# Patient Record
Sex: FEMALE | Race: WHITE | NOT HISPANIC OR LATINO
[De-identification: names, ages, dates, MRNs, and addresses within clinical notes are randomized per-mention and may not be internally consistent; named-entity substitution may affect disease eponyms.]

---

## 2017-08-25 ENCOUNTER — TRANSCRIPTION ENCOUNTER (OUTPATIENT)
Age: 52
End: 2017-08-25

## 2018-07-16 ENCOUNTER — APPOINTMENT (OUTPATIENT)
Dept: ORTHOPEDIC SURGERY | Facility: CLINIC | Age: 53
End: 2018-07-16
Payer: COMMERCIAL

## 2018-07-16 VITALS
SYSTOLIC BLOOD PRESSURE: 127 MMHG | HEART RATE: 89 BPM | DIASTOLIC BLOOD PRESSURE: 90 MMHG | HEIGHT: 70 IN | WEIGHT: 250 LBS | BODY MASS INDEX: 35.79 KG/M2

## 2018-07-16 DIAGNOSIS — Z85.3 PERSONAL HISTORY OF MALIGNANT NEOPLASM OF BREAST: ICD-10-CM

## 2018-07-16 DIAGNOSIS — M79.605 LOW BACK PAIN: ICD-10-CM

## 2018-07-16 DIAGNOSIS — M51.26 OTHER INTERVERTEBRAL DISC DISPLACEMENT, LUMBAR REGION: ICD-10-CM

## 2018-07-16 DIAGNOSIS — M54.16 RADICULOPATHY, LUMBAR REGION: ICD-10-CM

## 2018-07-16 DIAGNOSIS — M54.5 LOW BACK PAIN: ICD-10-CM

## 2018-07-16 DIAGNOSIS — Z87.09 PERSONAL HISTORY OF OTHER DISEASES OF THE RESPIRATORY SYSTEM: ICD-10-CM

## 2018-07-16 DIAGNOSIS — Z80.9 FAMILY HISTORY OF MALIGNANT NEOPLASM, UNSPECIFIED: ICD-10-CM

## 2018-07-16 DIAGNOSIS — Z78.9 OTHER SPECIFIED HEALTH STATUS: ICD-10-CM

## 2018-07-16 DIAGNOSIS — Z87.19 PERSONAL HISTORY OF OTHER DISEASES OF THE DIGESTIVE SYSTEM: ICD-10-CM

## 2018-07-16 PROCEDURE — 99204 OFFICE O/P NEW MOD 45 MIN: CPT

## 2018-07-16 PROCEDURE — 72110 X-RAY EXAM L-2 SPINE 4/>VWS: CPT

## 2018-07-16 PROCEDURE — 72170 X-RAY EXAM OF PELVIS: CPT | Mod: 59

## 2018-07-16 RX ORDER — PAROXETINE HYDROCHLORIDE 40 MG/1
TABLET, FILM COATED ORAL
Refills: 0 | Status: ACTIVE | COMMUNITY

## 2018-07-16 RX ORDER — METHYLPREDNISOLONE 4 MG/1
4 TABLET ORAL
Qty: 1 | Refills: 0 | Status: ACTIVE | COMMUNITY
Start: 2018-07-16 | End: 1900-01-01

## 2018-07-18 ENCOUNTER — CHART COPY (OUTPATIENT)
Age: 53
End: 2018-07-18

## 2018-08-06 ENCOUNTER — OUTPATIENT (OUTPATIENT)
Dept: OUTPATIENT SERVICES | Facility: HOSPITAL | Age: 53
LOS: 1 days | End: 2018-08-06
Payer: COMMERCIAL

## 2018-08-06 DIAGNOSIS — M54.16 RADICULOPATHY, LUMBAR REGION: ICD-10-CM

## 2018-08-06 PROCEDURE — 77003 FLUOROGUIDE FOR SPINE INJECT: CPT

## 2018-08-06 PROCEDURE — 62323 NJX INTERLAMINAR LMBR/SAC: CPT

## 2020-12-09 DIAGNOSIS — Z01.818 ENCOUNTER FOR OTHER PREPROCEDURAL EXAMINATION: ICD-10-CM

## 2020-12-11 ENCOUNTER — APPOINTMENT (OUTPATIENT)
Dept: DISASTER EMERGENCY | Facility: CLINIC | Age: 55
End: 2020-12-11

## 2020-12-13 LAB — SARS-COV-2 N GENE NPH QL NAA+PROBE: NOT DETECTED

## 2022-10-06 ENCOUNTER — APPOINTMENT (OUTPATIENT)
Dept: ORTHOPEDIC SURGERY | Facility: CLINIC | Age: 57
End: 2022-10-06

## 2022-10-06 PROCEDURE — 73564 X-RAY EXAM KNEE 4 OR MORE: CPT | Mod: RT

## 2022-10-06 PROCEDURE — 99203 OFFICE O/P NEW LOW 30 MIN: CPT

## 2022-10-06 NOTE — PHYSICAL EXAM
[NL (0)] : extension 0 degrees [5___] : hamstring 5[unfilled]/5 [Positive] : positive Camila [] : patient ambulates without assistive device [Right] : right knee [AP] : anteroposterior [Lateral] : lateral [[place]] : skyline [AP Standing] : anteroposterior standing [There are no fractures, subluxations or dislocations. No significant abnormalities are seen] : There are no fractures, subluxations or dislocations. No significant abnormalities are seen [Degenerative change] : Degenerative change [TWNoteComboBox7] : flexion 130 degrees

## 2022-10-06 NOTE — HISTORY OF PRESENT ILLNESS
[7] : 7 [3] : 3 [Dull/Aching] : dull/aching [Sharp] : sharp [Shooting] : shooting [de-identified] : 10/6/22: 58 yo f () here for an evaluation of her right knee. Pain ongoing x 6 weeks without injury. Pain worse with twisting injuries and with ambulation.  Pain and difficulty with fully bending the knee, sense of the knee locking. Occasional buckling. She has been doing HEP, icing and taking advil without relief. [] : no [FreeTextEntry1] : rt knee [FreeTextEntry5] : no reported injury, pain started about 6 weeks ago.history of knee issues in the right knee. [FreeTextEntry7] : into the thigh

## 2022-10-06 NOTE — DISCUSSION/SUMMARY
[de-identified] : 57f with medial right knee pain, djd and instability. s/s of meniscal tear\par 1) MRI right knee, eval meniscal tear\par 2) cryotherapy, rest and activity modification\par 3) rtc after MRI \par \par Entered by Joie Mccall acting as scribe.\par

## 2022-10-09 ENCOUNTER — FORM ENCOUNTER (OUTPATIENT)
Age: 57
End: 2022-10-09

## 2022-10-10 ENCOUNTER — APPOINTMENT (OUTPATIENT)
Dept: MRI IMAGING | Facility: CLINIC | Age: 57
End: 2022-10-10

## 2022-10-10 PROCEDURE — 73721 MRI JNT OF LWR EXTRE W/O DYE: CPT | Mod: RT

## 2022-10-12 ENCOUNTER — APPOINTMENT (OUTPATIENT)
Dept: ORTHOPEDIC SURGERY | Facility: CLINIC | Age: 57
End: 2022-10-12

## 2022-10-12 ENCOUNTER — APPOINTMENT (OUTPATIENT)
Dept: MRI IMAGING | Facility: CLINIC | Age: 57
End: 2022-10-12

## 2022-10-12 VITALS — HEIGHT: 70 IN | BODY MASS INDEX: 35.79 KG/M2 | WEIGHT: 250 LBS

## 2022-10-12 PROCEDURE — 99214 OFFICE O/P EST MOD 30 MIN: CPT

## 2022-10-12 NOTE — DATA REVIEWED
[MRI] : MRI [Right] : of the right [Knee] : knee [Report was reviewed and noted in the chart] : The report was reviewed and noted in the chart [I independently reviewed and interpreted images and report] : I independently reviewed and interpreted images and report [I reviewed the films/CD] : I reviewed the films/CD [FreeTextEntry1] : 10.10.22\par 1. Complex posterior medial meniscal root tearing with severe surrounding synovitis and mild medial extrusion of the body.\par 2. Chronic ligament sprains, MCL laxity, extensor mechanism tendinopathy, and tricompartmental chondral \par loss and osteophytes with medial joint space narrowing, mild subchondral edema in the posterior medial tibial plateau and moderate effusion with mild prepatellar soft tissue swelling/bursitis.

## 2022-10-12 NOTE — DISCUSSION/SUMMARY
[de-identified] : 57f  with right knee complex posterior medial meniscal root tear.\par r/b/a of surgical intervention and conservative management discussed. pt given to opportunity to ask all questions and all questions were answered.   Pt would like to proceed with surgery as discussed.\par \par Will plan for right knee arthroscopy, partial medial meniscectomy vs meniscal root repair. \par \par The patient was advised of the diagnosis. The natural history of the pathology was explained in full to the patient in layman's terms. All questions were answered. The risks and benefits of surgical and non-surgical treatment alternatives were explained in full to the patient. \par The patient demonstrated a full understanding of the surgical and non-surgical options. The risks of surgery were outlined in full to the patient including but not limited to bleeding, scarring, infection, sepsis, neurologic injury, vascular injury, failure to resolve symptoms, symptom recurrence, the need for further surgery, non-healing, wound breakdown, deep vein thrombosis, pulmonary embolism, spontaneous osteonecrosis, anesthesia complications and even death. The patient understood all the risks and accepted them and understood that other complications could occur that are not mentioned above. The intraoperative plan, post-operative plan, post-operative expectations and limitations were explained in full. Expectations from non-surgical treatment were explained in full as well. The patient demonstrated a complete understanding of the treatment alternatives and requested the above-mentioned procedure. This will be scheduled accordingly\par \par The patient has two pathologic conditions at this point. There is a meniscus tear which is causing symptomology and there is also underlying osteoarthritis. The patient was counseled that surgical intervention to address the torn meniscus will not change the symptomology attributable to the arthritis. The patient was advised that the pain attributable to the meniscus tear should be resolved arthroscopically. However, the patient should expect to have continued aches and pains related to the arthritis, in the form of, but not limited to pain, weather related changes, dull ache, crepitation, limitation of motion and strength and the need for further surgeries. The patient understands that the arthroscopic procedure addresses the meniscus only and that after surgery, the knee will not be 100% but it should be improved with respect to the symptomology attributed to the torn meniscus. Patient also is aware that further treatment after arthroscopy may be necessary in the form of oral medications, cortisone and/or viscosupplementation injections, and further surgeries including knee replacement. The patient agrees, understands and wishes to proceed.\par \par \par Entered by Joie Mccall acting as scribe.\par  \par

## 2022-10-12 NOTE — PHYSICAL EXAM
[NL (0)] : extension 0 degrees [5___] : hamstring 5[unfilled]/5 [Positive] : positive Camila [Right] : right knee [AP] : anteroposterior [Lateral] : lateral [[place]] : skyline [AP Standing] : anteroposterior standing [There are no fractures, subluxations or dislocations. No significant abnormalities are seen] : There are no fractures, subluxations or dislocations. No significant abnormalities are seen [Degenerative change] : Degenerative change [] : no extensor lag [TWNoteComboBox7] : flexion 130 degrees

## 2022-10-12 NOTE — HISTORY OF PRESENT ILLNESS
[7] : 7 [3] : 3 [Dull/Aching] : dull/aching [Sharp] : sharp [Shooting] : shooting [de-identified] : 10/12/22: Here for right knee MRI results\par 10/6/22: 56 yo f () here for an evaluation of her right knee. Pain ongoing x 6 weeks without injury. Pain worse with twisting injuries and with ambulation.  Pain and difficulty with fully bending the knee, sense of the knee locking. Occasional buckling. She has been doing HEP, icing and taking advil without relief. [] : no [FreeTextEntry1] : rt knee [FreeTextEntry5] : no reported injury, pain started about 6 weeks ago.history of knee issues in the right knee. [FreeTextEntry7] : into the thigh

## 2022-10-19 ENCOUNTER — APPOINTMENT (OUTPATIENT)
Dept: ORTHOPEDIC SURGERY | Facility: CLINIC | Age: 57
End: 2022-10-19

## 2022-10-19 ENCOUNTER — FORM ENCOUNTER (OUTPATIENT)
Age: 57
End: 2022-10-19

## 2022-10-31 ENCOUNTER — APPOINTMENT (OUTPATIENT)
Age: 57
End: 2022-10-31
Payer: COMMERCIAL

## 2022-10-31 DIAGNOSIS — M23.91 UNSPECIFIED INTERNAL DERANGEMENT OF RIGHT KNEE: ICD-10-CM

## 2022-10-31 PROCEDURE — 29881 ARTHRS KNE SRG MNISECTMY M/L: CPT | Mod: RT

## 2022-10-31 PROCEDURE — 29876 ARTHRS KNEE SURG SYNVCT MAJ: CPT | Mod: RT

## 2022-10-31 PROCEDURE — ZZZZZ: CPT

## 2022-10-31 RX ORDER — HYDROCODONE BITARTRATE AND ACETAMINOPHEN 5; 325 MG/1; MG/1
5-325 TABLET ORAL EVERY 6 HOURS
Qty: 15 | Refills: 0 | Status: ACTIVE | COMMUNITY
Start: 2022-10-31 | End: 1900-01-01

## 2022-10-31 RX ORDER — ASPIRIN 325 MG/1
325 TABLET, FILM COATED ORAL DAILY
Qty: 14 | Refills: 0 | Status: ACTIVE | COMMUNITY
Start: 2022-10-31 | End: 1900-01-01

## 2022-11-09 ENCOUNTER — APPOINTMENT (OUTPATIENT)
Dept: ORTHOPEDIC SURGERY | Facility: CLINIC | Age: 57
End: 2022-11-09
Payer: COMMERCIAL

## 2022-11-09 ENCOUNTER — APPOINTMENT (OUTPATIENT)
Dept: ORTHOPEDIC SURGERY | Facility: CLINIC | Age: 57
End: 2022-11-09

## 2022-11-09 PROCEDURE — 99024 POSTOP FOLLOW-UP VISIT: CPT

## 2022-11-09 NOTE — DISCUSSION/SUMMARY
[de-identified] : 57f 9 days s/p right knee partial meniscectomy. doing well. working with pt. mild effusion\par 1) continue pt\par 2) wbat\par 3) activity modification discussed\par 4) cryotherapy\par 5) rtc in 4 weeks

## 2022-11-09 NOTE — HISTORY OF PRESENT ILLNESS
[Localized] : localized [Household chores] : household chores [Physical therapy] : physical therapy [Walking] : walking [Gradual] : gradual [8] : 8 [2] : 2 [Sharp] : sharp [Intermittent] : intermittent [de-identified] : DOS: 10/31/2022\par \par 11/09/22 here for first post op visit \par  right knee arthroscopy, partial medial meniscectomy done on 10/31/22 doing well\par \par  [] : no [FreeTextEntry1] : right knee [de-identified] : sx

## 2022-11-09 NOTE — PHYSICAL EXAM
[Right] : right knee [4___] : hamstring 4[unfilled]/5 [] : mildly antalgic [TWNoteComboBox7] : flexion 110 degrees [de-identified] : extension 3 degrees

## 2022-11-16 ENCOUNTER — APPOINTMENT (OUTPATIENT)
Dept: ORTHOPEDIC SURGERY | Facility: CLINIC | Age: 57
End: 2022-11-16

## 2022-12-07 ENCOUNTER — APPOINTMENT (OUTPATIENT)
Dept: ORTHOPEDIC SURGERY | Facility: CLINIC | Age: 57
End: 2022-12-07
Payer: COMMERCIAL

## 2022-12-07 VITALS — BODY MASS INDEX: 35.79 KG/M2 | WEIGHT: 250 LBS | HEIGHT: 70 IN

## 2022-12-07 PROCEDURE — 99024 POSTOP FOLLOW-UP VISIT: CPT

## 2022-12-07 NOTE — HISTORY OF PRESENT ILLNESS
Pt advised    [Gradual] : gradual [8] : 8 [0] : 0 [Localized] : localized [Sharp] : sharp [Intermittent] : intermittent [Household chores] : household chores [Physical therapy] : physical therapy [Walking] : walking [Full time] : Work status: full time [de-identified] : DOS: 10/31/2022\par 12/7/22: Here for pov2 s/p right knee pmm. Doing well. Attending PT. \par 11/09/22 here for first post op visit  right knee arthroscopy, partial medial meniscectomy done on 10/31/22 doing well\par \par  [] : no [FreeTextEntry1] : right knee [FreeTextEntry5] : 2nd post op visit, right knee arthroscopy, partial medial meniscectomy done on 10/31/22, doing well. continuing PT [de-identified] : sx [de-identified] :

## 2022-12-07 NOTE — DISCUSSION/SUMMARY
[de-identified] : 57f s/p right knee pmm 10.31.22\par 1) c/w pt and hep\par 2) cryotherapy, rest and activity modification\par 3) activity as tolerated\par 4) rtc prn\par \par Entered by Joie Mccall acting as scribe.\par

## 2022-12-07 NOTE — PHYSICAL EXAM
[Right] : right knee [] : patient ambulates without assistive device [TWNoteComboBox7] : flexion 135 degrees [de-identified] : extension 3 degrees

## 2023-01-10 DIAGNOSIS — S83.231A COMPLEX TEAR OF MEDIAL MENISCUS, CURRENT INJURY, RIGHT KNEE, INITIAL ENCOUNTER: ICD-10-CM

## 2023-01-18 ENCOUNTER — APPOINTMENT (OUTPATIENT)
Dept: ORTHOPEDIC SURGERY | Facility: CLINIC | Age: 58
End: 2023-01-18
Payer: COMMERCIAL

## 2023-01-18 VITALS — HEIGHT: 70 IN | BODY MASS INDEX: 35.79 KG/M2 | WEIGHT: 250 LBS

## 2023-01-18 PROCEDURE — 99024 POSTOP FOLLOW-UP VISIT: CPT

## 2023-01-18 NOTE — HISTORY OF PRESENT ILLNESS
[5] : 5 [Localized] : localized [Sharp] : sharp [de-identified] : DOS: 10/31/2022\par 1/18/23: here for pov s/p right knee pmm, Attending PT. c/o some persistent posterior stiffness and some continued difficulty with stairs.  \par 12/7/22: Here for pov2 s/p right knee pmm. Doing well. Attending PT. \par 11/09/22 here for first post op visit  right knee arthroscopy, partial medial meniscectomy done on 10/31/22 doing well\par \par  [] : no [FreeTextEntry1] : right knee  [de-identified] : pt

## 2023-01-18 NOTE — PHYSICAL EXAM
[Right] : right knee [] : patient ambulates without assistive device [TWNoteComboBox7] : flexion 135 degrees [de-identified] : extension 0 degrees

## 2023-01-18 NOTE — DISCUSSION/SUMMARY
[de-identified] : 57f s/p right knee pmm, 10.31.22, residual quad weakness and likely osteoarthritis flare\par 1) c/w pt and hep, focus on strengthening. \par 2) cryotherapy, rest and activity modification\par 3) activity as tolerated\par 4) discussed availability of visco injections and pt would like to proceed. will request auth for a series of injections. \par 5) rtc with auth for injections\par \par \par Entered by Joie Mccall acting as scribe.\par  [facility] Pregnancy And Lactation Text: This medication is Pregnancy Category C and it isn't known if it is safe during pregnancy. It is unknown if it is excreted in breast milk.

## 2023-02-18 ENCOUNTER — RESULT REVIEW (OUTPATIENT)
Age: 58
End: 2023-02-18

## 2023-02-22 ENCOUNTER — APPOINTMENT (OUTPATIENT)
Dept: ORTHOPEDIC SURGERY | Facility: CLINIC | Age: 58
End: 2023-02-22
Payer: COMMERCIAL

## 2023-02-22 PROCEDURE — 99214 OFFICE O/P EST MOD 30 MIN: CPT | Mod: 25

## 2023-02-22 PROCEDURE — 20611 DRAIN/INJ JOINT/BURSA W/US: CPT | Mod: RT

## 2023-02-22 NOTE — HISTORY OF PRESENT ILLNESS
[de-identified] : DOS: 10/31/2022\par 2/22/23: here for MRI results of right knee. Finished PT 2/13/23. No change in symptoms since last visit\par 1/18/23: here for pov s/p right knee pmm, Attending PT. c/o some persistent posterior stiffness and some continued difficulty with stairs.  \par 12/7/22: Here for pov2 s/p right knee pmm. Doing well. Attending PT. \par 11/09/22 here for first post op visit  right knee arthroscopy, partial medial meniscectomy done on 10/31/22 doing well\par \par

## 2023-02-22 NOTE — PROCEDURE
[Other: ____] : [unfilled] [Right] : of the right [Knee] : knee [Effusion] : effusion [de-identified] : 30cc [de-identified] : clear/straw [FreeTextEntry3] : Large joint injection was performed of the right knee. An injection of Lidocaine 3cc of 1% , Ropivacaine 4cc of 0.5% , Triamcinolone (Kenalog) 2cc of 40 mg  was used. \par Patient was advised to call if redness, pain or fever occur and apply ice for 15 minutes out of every hour for the next 12-24 hours as tolerated. \par \par Patient has tried OTC's including aspirin, Ibuprofen, Aleve, etc or prescription NSAIDS, and/or exercises at home and/or physical therapy without satisfactory response, patient had decreased mobility in the joint and the risks benefits, and alternatives have been discussed, and verbal consent was obtained. \par The site was prepped with alcohol, betadine and ethyl chloride sprayed topically\par \par The risks, benefits and contents of the injection have been discussed.  Risks include but are not limited to allergic reaction, flare reaction, permanent white skin discoloration at the injection site and infection.  The patient understands the risks and agrees to having the injection.  All questions have been answered.\par \par Ultrasound guidance was indicated for this patient due to prior failure or difficult injection. All ultrasound images have been permanently captured and stored accordingly in our picture archiving and communication system.\par

## 2023-02-22 NOTE — PHYSICAL EXAM
[Right] : right knee [] : no extensor lag [TWNoteComboBox7] : flexion 135 degrees [de-identified] : extension 0 degrees

## 2023-02-22 NOTE — DATA REVIEWED
[MRI] : MRI [Right] : of the right [Knee] : knee [Report was reviewed and noted in the chart] : The report was reviewed and noted in the chart [I independently reviewed and interpreted images and report] : I independently reviewed and interpreted images and report [I reviewed the films/CD] : I reviewed the films/CD [FreeTextEntry1] : 02.18.23 zp\par 1. Moderate joint effusion.\par 2.Minimal lateral patellar subluxation.\par 3.Quadriceps enthesopathy.\par 4.Osteoarthritis as detailed above.\par 5.Evidence of previous medial meniscus debridement as per above. Surface tearing of the body of the medial meniscus

## 2023-06-07 ENCOUNTER — APPOINTMENT (OUTPATIENT)
Dept: ORTHOPEDIC SURGERY | Facility: CLINIC | Age: 58
End: 2023-06-07
Payer: COMMERCIAL

## 2023-06-07 PROCEDURE — 99213 OFFICE O/P EST LOW 20 MIN: CPT | Mod: 25

## 2023-06-07 PROCEDURE — 20611 DRAIN/INJ JOINT/BURSA W/US: CPT | Mod: RT

## 2023-06-07 NOTE — PHYSICAL EXAM
[Right] : right knee [] : no extensor lag [TWNoteComboBox7] : flexion 130 degrees [de-identified] : extension 0 degrees

## 2023-06-07 NOTE — DISCUSSION/SUMMARY
[de-identified] : 57f s/p right knee pmm, 10.31.22,  djd and effusion on exam today. \par 1)  csi / aspiration right knee today - tolerated well - 25cc\par 2) cryotherapy, rest and activity modification\par 3) c/w pt/hep \par 4) rtc 2-3 weeks, euflexxa injections approved. \par \par Entered by Joie Mccall acting as scribe.\par

## 2023-06-07 NOTE — HISTORY OF PRESENT ILLNESS
[de-identified] : DOS: 10/31/2022\par 6/7/23: here for f/up right knee. felt better after aspiration and csi (2/22/23). Pain returned 3/31/23. Has been doing hep. Pain aggravated after activity. Has experienced a sense of instabilty over the knee. \par 2/22/23: here for MRI results of right knee. Finished PT 2/13/23. No change in symptoms since last visit\par 1/18/23: here for pov s/p right knee pmm, Attending PT. c/o some persistent posterior stiffness and some continued difficulty with stairs.  \par 12/7/22: Here for pov2 s/p right knee pmm. Doing well. Attending PT. \par 11/09/22 here for first post op visit  right knee arthroscopy, partial medial meniscectomy done on 10/31/22 doing well\par \par

## 2023-06-28 ENCOUNTER — APPOINTMENT (OUTPATIENT)
Dept: ORTHOPEDIC SURGERY | Facility: CLINIC | Age: 58
End: 2023-06-28
Payer: COMMERCIAL

## 2023-06-28 VITALS — HEIGHT: 70 IN | BODY MASS INDEX: 35.79 KG/M2 | WEIGHT: 250 LBS

## 2023-06-28 PROCEDURE — 20611 DRAIN/INJ JOINT/BURSA W/US: CPT | Mod: RT

## 2023-06-28 NOTE — PHYSICAL EXAM
[Right] : right knee [] : no extensor lag [TWNoteComboBox7] : flexion 130 degrees [de-identified] : extension 0 degrees

## 2023-06-28 NOTE — DISCUSSION/SUMMARY
[de-identified] : 57f s/p right knee pmm, 10.31.22, also with right knee djd.\par euflexxa #1 Injection tolerated well. Post injection instructions reviewed.\par 1) wbat, cryotherapy\par 2) rtc 1 week\par \par Entered by Joie Mccall acting as scribe.\par

## 2023-06-28 NOTE — HISTORY OF PRESENT ILLNESS
[1] : 1 [Euflexxa] : Euflexxa [de-identified] : DOS: 10/31/2022\par 6/28/23: here to f/up for right knee and euflexxa #1\par 6/7/23: here for f/up right knee. felt better after aspiration and csi (2/22/23). Pain returned 3/31/23. Has been doing hep. Pain aggravated after activity. Has experienced a sense of instabilty over the knee. \par 2/22/23: here for MRI results of right knee. Finished PT 2/13/23. No change in symptoms since last visit\par 1/18/23: here for pov s/p right knee pmm, Attending PT. c/o some persistent posterior stiffness and some continued difficulty with stairs.  \par 12/7/22: Here for pov2 s/p right knee pmm. Doing well. Attending PT. \par 11/09/22 here for first post op visit  right knee arthroscopy, partial medial meniscectomy done on 10/31/22 doing well\par \par  [] : This patient has had an injection before: no [FreeTextEntry1] : right knee

## 2023-06-28 NOTE — PROCEDURE
[FreeTextEntry3] : Large joint injection was performed  of the right knee. The indication for this procedure was x-ray evidence of Osteoarthritis on this or prior visit. The site was prepped with alcohol and betadine. An injection of Lidocaine 3cc of 1% , Euflexxa 2ml, # [1 ] was used. \par \par Patient was advised to call if redness, pain or fever occur and apply ice for 15 minutes out of every hour for the next 12-24 hours as tolerated. \par \par Patient has tried OTC's including aspirin, Ibuprofen, Aleve, etc or prescription NSAIDS, and/or exercises at home and/or physical therapy without satisfactory response, patient had decreased mobility in the joint and the risks benefits, and alternatives have been discussed, and verbal consent was obtained. \par \par The risks, benefits and contents of the injection have been discussed.  Risks include but are not limited to allergic reaction, flare reaction, permanent white skin discoloration at the injection site and infection.  The patient understands the risks and agrees to having the injection.  All questions have been answered.\par \par Ultrasound guidance was indicated for this patient due to prior failure or difficult injection.\par

## 2023-07-05 ENCOUNTER — APPOINTMENT (OUTPATIENT)
Dept: ORTHOPEDIC SURGERY | Facility: CLINIC | Age: 58
End: 2023-07-05
Payer: COMMERCIAL

## 2023-07-05 PROCEDURE — 20611 DRAIN/INJ JOINT/BURSA W/US: CPT | Mod: RT

## 2023-07-05 NOTE — PHYSICAL EXAM
[Right] : right knee [] : no extensor lag [TWNoteComboBox7] : flexion 130 degrees [de-identified] : extension 0 degrees

## 2023-07-05 NOTE — DISCUSSION/SUMMARY
[de-identified] : 57f s/p right knee pmm, 10.31.22, also with right knee djd.\par euflexxa #2 Injection tolerated well. Post injection instructions reviewed.\par 1) wbat, cryotherapy\par 2) rtc 1 week\par \par Entered by Joie Mccall acting as scribe.\par

## 2023-07-05 NOTE — PROCEDURE
[FreeTextEntry3] : Large joint injection was performed  of the right knee. The indication for this procedure was x-ray evidence of Osteoarthritis on this or prior visit. The site was prepped with alcohol and betadine. An injection of Lidocaine 3cc of 1% , Euflexxa 2ml, # [2 ] was used. \par \par Patient was advised to call if redness, pain or fever occur and apply ice for 15 minutes out of every hour for the next 12-24 hours as tolerated. \par \par Patient has tried OTC's including aspirin, Ibuprofen, Aleve, etc or prescription NSAIDS, and/or exercises at home and/or physical therapy without satisfactory response, patient had decreased mobility in the joint and the risks benefits, and alternatives have been discussed, and verbal consent was obtained. \par \par The risks, benefits and contents of the injection have been discussed.  Risks include but are not limited to allergic reaction, flare reaction, permanent white skin discoloration at the injection site and infection.  The patient understands the risks and agrees to having the injection.  All questions have been answered.\par \par Ultrasound guidance was indicated for this patient due to prior failure or difficult injection.\par

## 2023-07-05 NOTE — HISTORY OF PRESENT ILLNESS
[de-identified] : DOS: 10/31/2022\par 7/5/23: here to f/up right knee and euflexxa #2\par 6/28/23: here to f/up for right knee and euflexxa #1\par 6/7/23: here for f/up right knee. felt better after aspiration and csi (2/22/23). Pain returned 3/31/23. Has been doing hep. Pain aggravated after activity. Has experienced a sense of instabilty over the knee. \par 2/22/23: here for MRI results of right knee. Finished PT 2/13/23. No change in symptoms since last visit\par 1/18/23: here for pov s/p right knee pmm, Attending PT. c/o some persistent posterior stiffness and some continued difficulty with stairs.  \par 12/7/22: Here for pov2 s/p right knee pmm. Doing well. Attending PT. \par 11/09/22 here for first post op visit  right knee arthroscopy, partial medial meniscectomy done on 10/31/22 doing well\par \par

## 2023-07-12 ENCOUNTER — APPOINTMENT (OUTPATIENT)
Dept: ORTHOPEDIC SURGERY | Facility: CLINIC | Age: 58
End: 2023-07-12
Payer: COMMERCIAL

## 2023-07-12 PROCEDURE — 20611 DRAIN/INJ JOINT/BURSA W/US: CPT | Mod: RT

## 2023-07-12 NOTE — HISTORY OF PRESENT ILLNESS
[de-identified] : DOS: 10/31/2022\par 7/12/23: here to f/up right knee and euflexxa #3. \par 7/5/23: here to f/up right knee and euflexxa #2\par 6/28/23: here to f/up for right knee and euflexxa #1\par 6/7/23: here for f/up right knee. felt better after aspiration and csi (2/22/23). Pain returned 3/31/23. Has been doing hep. Pain aggravated after activity. Has experienced a sense of instabilty over the knee. \par 2/22/23: here for MRI results of right knee. Finished PT 2/13/23. No change in symptoms since last visit\par 1/18/23: here for pov s/p right knee pmm, Attending PT. c/o some persistent posterior stiffness and some continued difficulty with stairs.  \par 12/7/22: Here for pov2 s/p right knee pmm. Doing well. Attending PT. \par 11/09/22 here for first post op visit  right knee arthroscopy, partial medial meniscectomy done on 10/31/22 doing well\par \par

## 2023-07-12 NOTE — PROCEDURE
[FreeTextEntry3] : Large joint injection was performed  of the right knee. The indication for this procedure was x-ray evidence of Osteoarthritis on this or prior visit. The site was prepped with alcohol and betadine. An injection of Lidocaine 3cc of 1% , Euflexxa 2ml, # [3 ] was used. \par \par Patient was advised to call if redness, pain or fever occur and apply ice for 15 minutes out of every hour for the next 12-24 hours as tolerated. \par \par Patient has tried OTC's including aspirin, Ibuprofen, Aleve, etc or prescription NSAIDS, and/or exercises at home and/or physical therapy without satisfactory response, patient had decreased mobility in the joint and the risks benefits, and alternatives have been discussed, and verbal consent was obtained. \par \par The risks, benefits and contents of the injection have been discussed.  Risks include but are not limited to allergic reaction, flare reaction, permanent white skin discoloration at the injection site and infection.  The patient understands the risks and agrees to having the injection.  All questions have been answered.\par \par Ultrasound guidance was indicated for this patient due to prior failure or difficult injection.\par

## 2023-07-12 NOTE — DISCUSSION/SUMMARY
[de-identified] : 57f s/p right knee pmm, 10.31.22, also with right knee djd.\par euflexxa #3 Injection tolerated well. Post injection instructions reviewed.\par 1) wbat, cryotherapy\par 2) rtc 6 weeks\par \par Entered by Joie Mccall acting as scribe.\par

## 2023-07-12 NOTE — PHYSICAL EXAM
[Right] : right knee [] : no extensor lag [TWNoteComboBox7] : flexion 130 degrees [de-identified] : extension 0 degrees

## 2023-07-26 ENCOUNTER — APPOINTMENT (OUTPATIENT)
Dept: ORTHOPEDIC SURGERY | Facility: CLINIC | Age: 58
End: 2023-07-26
Payer: COMMERCIAL

## 2023-07-26 VITALS — WEIGHT: 250 LBS | HEIGHT: 70 IN | BODY MASS INDEX: 35.79 KG/M2

## 2023-07-26 PROCEDURE — 20611 DRAIN/INJ JOINT/BURSA W/US: CPT | Mod: RT

## 2023-07-26 PROCEDURE — J3490M: CUSTOM

## 2023-07-26 NOTE — HISTORY OF PRESENT ILLNESS
[de-identified] : DOS: 10/31/2022\par 7/26/23: here to f/up right knee. Reports stiffness and swelling return 7/15\par 7/12/23: here to f/up right knee and euflexxa #3. \par 7/5/23: here to f/up right knee and euflexxa #2\par 6/28/23: here to f/up for right knee and euflexxa #1\par 6/7/23: here for f/up right knee. felt better after aspiration and csi (2/22/23). Pain returned 3/31/23. Has been doing hep. Pain aggravated after activity. Has experienced a sense of instabilty over the knee. \par 2/22/23: here for MRI results of right knee. Finished PT 2/13/23. No change in symptoms since last visit\par 1/18/23: here for pov s/p right knee pmm, Attending PT. c/o some persistent posterior stiffness and some continued difficulty with stairs.  \par 12/7/22: Here for pov2 s/p right knee pmm. Doing well. Attending PT. \par 11/09/22 here for first post op visit  right knee arthroscopy, partial medial meniscectomy done on 10/31/22 doing well\par \par

## 2023-07-26 NOTE — PHYSICAL EXAM
[Right] : right knee [] : no extensor lag [TWNoteComboBox7] : flexion 130 degrees [de-identified] : extension 0 degrees

## 2023-07-26 NOTE — DISCUSSION/SUMMARY
[de-identified] : 57f s/p right knee pmm, 10.31.22, also with right knee djd.\par 1) Aspirated right knee today- collected 25 cc\par 2) wbat, cryotherapy, rest and activity modification\par 3) rtc prn\par \par Entered by Joie Mccall acting as scribe.\par

## 2023-07-26 NOTE — PROCEDURE
[facility]  [address]  [address] [id number]  Phone: [phone]  Fax: GREGG Joyner CNP        January 27, 2023     Patient: Kasey Matt   YOB: 1999   Date of Visit: 1/27/2023       To Whom it May Concern:    Yolande Jones was seen in my clinic on 1/27/2023. She may return to work on 01/29/2023. If you have any questions or concerns, please don't hesitate to call.     Sincerely,         GREGG Chowdary - CNP
[FreeTextEntry3] : KNEE ASPIRATION - RIGHT\par After verbal consent and identification of the correct patient and correct site, the superolateral right knee was prepped using alcohol swabs and betadine. This was allowed time to air dry. \par 25 cc of yellow fluid was aspirated from the knee using a sterile 18G needle after ethyl chloride spray for skin anesthesia. The patient tolerated the procedure well. After-care instructions were provided and included instructions to ice the area and to call if redness, pain, or fever develop.\par

## 2023-08-16 ENCOUNTER — APPOINTMENT (OUTPATIENT)
Dept: ORTHOPEDIC SURGERY | Facility: CLINIC | Age: 58
End: 2023-08-16
Payer: COMMERCIAL

## 2023-08-16 VITALS — HEIGHT: 70 IN | WEIGHT: 250 LBS | BODY MASS INDEX: 35.79 KG/M2

## 2023-08-16 PROCEDURE — 20611 DRAIN/INJ JOINT/BURSA W/US: CPT | Mod: RT

## 2023-08-16 NOTE — DISCUSSION/SUMMARY
[de-identified] : 57f s/p right knee pmm, 10.31.22, also with right knee djd and effusion.  1) Aspirated right knee today- 20cc 2) wbat, cryotherapy, rest and activity modification 3)  rtc 4-6 weeks, will consider zilretta injection  Entered by Joie Mccall acting as scribe.

## 2023-08-16 NOTE — HISTORY OF PRESENT ILLNESS
[6] : 6 [Dull/Aching] : dull/aching [Throbbing] : throbbing [Constant] : constant [de-identified] : DOS: 10/31/2022 8/16/23: Here to f/up right knee. Aspiration at last visit with relief. Reports that she has been seeing a return of pain and swelling over the right knee  7/26/23: here to f/up right knee. Reports stiffness and swelling return 7/15 7/12/23: here to f/up right knee and euflexxa #3.  7/5/23: here to f/up right knee and euflexxa #2 6/28/23: here to f/up for right knee and euflexxa #1 6/7/23: here for f/up right knee. felt better after aspiration and csi (2/22/23). Pain returned 3/31/23. Has been doing hep. Pain aggravated after activity. Has experienced a sense of instabilty over the knee.  2/22/23: here for MRI results of right knee. Finished PT 2/13/23. No change in symptoms since last visit 1/18/23: here for pov s/p right knee pmm, Attending PT. c/o some persistent posterior stiffness and some continued difficulty with stairs.   12/7/22: Here for pov2 s/p right knee pmm. Doing well. Attending PT.  11/09/22 here for first post op visit  right knee arthroscopy, partial medial meniscectomy done on 10/31/22 doing well   [] : no [FreeTextEntry1] : right knee  [FreeTextEntry6] : swollen.  [de-identified] : none

## 2023-08-16 NOTE — PROCEDURE
[Other: ____] : [unfilled] [Right] : of the right [Pain] : pain [Inflammation] : inflammation [Alcohol] : alcohol [Betadine] : betadine [Ethyl Chloride sprayed topically] : ethyl chloride sprayed topically [Sterile technique used] : sterile technique used [___ cc    1%] : Lidocaine ~Vcc of 1%  [Effusion] : effusion [] : Patient tolerated procedure well [Previous OTC use and PT nontherapeutic] : patient has tried OTC's including aspirin, Ibuprofen, Aleve, etc or prescription NSAIDS, and/or exercises at home and/or physical therapy without satisfactory response [Patient had decreased mobility in the joint] : patient had decreased mobility in the joint [Risks, benefits, alternatives discussed / Verbal consent obtained] : the risks benefits, and alternatives have been discussed, and verbal consent was obtained [All ultrasound images have been permanently captured and stored accordingly in our picture archiving and communication system] : All ultrasound images have been permanently captured and stored accordingly in our picture archiving and communication system [de-identified] : 20cc [de-identified] : clear NSF

## 2023-08-16 NOTE — PHYSICAL EXAM
[Right] : right knee [] : no extensor lag [TWNoteComboBox7] : flexion 130 degrees [de-identified] : extension 0 degrees

## 2023-09-13 ENCOUNTER — APPOINTMENT (OUTPATIENT)
Dept: ORTHOPEDIC SURGERY | Facility: CLINIC | Age: 58
End: 2023-09-13
Payer: COMMERCIAL

## 2023-09-13 DIAGNOSIS — Z98.890 OTHER SPECIFIED POSTPROCEDURAL STATES: ICD-10-CM

## 2023-09-13 DIAGNOSIS — M25.461 EFFUSION, RIGHT KNEE: ICD-10-CM

## 2023-09-13 PROCEDURE — 20611 DRAIN/INJ JOINT/BURSA W/US: CPT | Mod: 50

## 2023-12-13 ENCOUNTER — APPOINTMENT (OUTPATIENT)
Dept: ORTHOPEDIC SURGERY | Facility: CLINIC | Age: 58
End: 2023-12-13

## 2023-12-13 VITALS — BODY MASS INDEX: 35.79 KG/M2 | HEIGHT: 70 IN | WEIGHT: 250 LBS

## 2023-12-13 DIAGNOSIS — M79.672 PAIN IN LEFT FOOT: ICD-10-CM

## 2023-12-13 DIAGNOSIS — Z00.00 ENCOUNTER FOR GENERAL ADULT MEDICAL EXAMINATION W/OUT ABNORMAL FINDINGS: ICD-10-CM

## 2023-12-13 PROCEDURE — 73630 X-RAY EXAM OF FOOT: CPT | Mod: 1L,LT

## 2023-12-13 PROCEDURE — J3490M: CUSTOM | Mod: 1L

## 2023-12-13 PROCEDURE — 99213 OFFICE O/P EST LOW 20 MIN: CPT | Mod: 1L,25

## 2023-12-13 PROCEDURE — 20611 DRAIN/INJ JOINT/BURSA W/US: CPT | Mod: 1L,RT

## 2023-12-13 NOTE — HISTORY OF PRESENT ILLNESS
[de-identified] : DOS: 10/31/2022 12/13/23: here to f/up right knee. Had good relief from Zilretta (9/13/23) until 11/15/23 when pain started to return. Also c/o left foot pain over the past 1 month, most present with WB, pushing off.  9/13/23: here to f/up right knee. Aspiration on 8/16 which gave her ~1 day of relief. swelling returned. 8/16/23: Here to f/up right knee. Aspiration at last visit with relief. Reports that she has been seeing a return of pain and swelling over the right knee  7/26/23: here to f/up right knee. Reports stiffness and swelling return 7/15 7/12/23: here to f/up right knee and euflexxa #3.  7/5/23: here to f/up right knee and euflexxa #2 6/28/23: here to f/up for right knee and euflexxa #1 6/7/23: here for f/up right knee. felt better after aspiration and csi (2/22/23). Pain returned 3/31/23. Has been doing hep. Pain aggravated after activity. Has experienced a sense of instabilty over the knee.  2/22/23: here for MRI results of right knee. Finished PT 2/13/23. No change in symptoms since last visit 1/18/23: here for pov s/p right knee pmm, Attending PT. c/o some persistent posterior stiffness and some continued difficulty with stairs.   12/7/22: Here for pov2 s/p right knee pmm. Doing well. Attending PT.  11/09/22 here for first post op visit  right knee arthroscopy, partial medial meniscectomy done on 10/31/22 doing well

## 2023-12-13 NOTE — PHYSICAL EXAM
[Right] : right knee [Left] : left foot and ankle [3rd] : 3rd [4th] : 4th [5___] : eversion 5[unfilled]/5 [TWNoteComboBox7] : flexion 130 degrees [de-identified] : extension 0 degrees [] : no plantar metatarsal head tenderness

## 2023-12-13 NOTE — PROCEDURE
[Large Joint Injection] : Large joint injection [Right] : of the right [Knee] : knee [Pain] : pain [Inflammation] : inflammation [Alcohol] : alcohol [Betadine] : betadine [Ethyl Chloride sprayed topically] : ethyl chloride sprayed topically [Sterile technique used] : sterile technique used [___ cc    1%] : Lidocaine ~Vcc of 1%  [___ cc    0.25%] : Bupivacaine (Marcaine) ~Vcc of 0.25%  [___ cc    32 units 5mg] : Zilretta ~Vcc of 32 units 5 mg  [Effusion] : effusion [] : Patient tolerated procedure well [Previous OTC use and PT nontherapeutic] : patient has tried OTC's including aspirin, Ibuprofen, Aleve, etc or prescription NSAIDS, and/or exercises at home and/or physical therapy without satisfactory response [Patient had decreased mobility in the joint] : patient had decreased mobility in the joint [Risks, benefits, alternatives discussed / Verbal consent obtained] : the risks benefits, and alternatives have been discussed, and verbal consent was obtained [Prior failure or difficult injection] : prior failure or difficult injection [All ultrasound images have been permanently captured and stored accordingly in our picture archiving and communication system] : All ultrasound images have been permanently captured and stored accordingly in our picture archiving and communication system [de-identified] : 20cc [de-identified] : clear nsf

## 2023-12-13 NOTE — DISCUSSION/SUMMARY
[de-identified] : 58f s/p right knee pmm, 10.31.22, also with right knee djd and effusion.  1)  Zilretta inj and aspiration right knee today - tolerated well - 20cc 2) wbat, cryotherapy, rest and activity modification 3) NSAIDS as needed for pain 4)  rtc 3 months  Entered by Joie Mccall acting as scribe. Dr. Enriquez- The documentation recorded by the scribe accurately reflects the service I personally performed and the decisions made by me.

## 2024-01-16 ENCOUNTER — APPOINTMENT (OUTPATIENT)
Dept: OBGYN | Facility: CLINIC | Age: 59
End: 2024-01-16
Payer: COMMERCIAL

## 2024-01-16 PROCEDURE — 99396 PREV VISIT EST AGE 40-64: CPT

## 2024-03-20 ENCOUNTER — APPOINTMENT (OUTPATIENT)
Dept: ORTHOPEDIC SURGERY | Facility: CLINIC | Age: 59
End: 2024-03-20
Payer: COMMERCIAL

## 2024-03-20 DIAGNOSIS — M17.11 UNILATERAL PRIMARY OSTEOARTHRITIS, RIGHT KNEE: ICD-10-CM

## 2024-03-20 PROCEDURE — J3490M: CUSTOM

## 2024-03-20 PROCEDURE — 20611 DRAIN/INJ JOINT/BURSA W/US: CPT | Mod: RT

## 2024-03-20 PROCEDURE — 99213 OFFICE O/P EST LOW 20 MIN: CPT | Mod: 25

## 2024-03-20 NOTE — PHYSICAL EXAM
[Right] : right knee [Left] : left foot and ankle [4th] : 4th [3rd] : 3rd [5___] : eversion 5[unfilled]/5 [TWNoteComboBox7] : flexion 130 degrees [de-identified] : extension 0 degrees [] : no plantar metatarsal head tenderness

## 2024-03-20 NOTE — DISCUSSION/SUMMARY
[de-identified] : 58f s/p right knee pmm, 10.31.22, also with right knee djd and effusion.   1) csi right knee today - tolerated well  2) wbat, cryotherapy, rest and activity modification 3) NSAIDS as needed for pain   Entered by Joie Mccall acting as scribe. Dr. Enriquez- The documentation recorded by the scribe accurately reflects the service I personally performed and the decisions made by me.

## 2024-03-20 NOTE — HISTORY OF PRESENT ILLNESS
[de-identified] : DOS: 10/31/2022 3/20/24: here to f/up right knee. Had relief from Zilretta (12/13/23)) until 02/20/24.  12/13/23: here to f/up right knee. Had good relief from Zilretta (9/13/23) until 11/15/23 when pain started to return. Also c/o left foot pain over the past 1 month, most present with WB, pushing off.  9/13/23: here to f/up right knee. Aspiration on 8/16 which gave her ~1 day of relief. swelling returned. 8/16/23: Here to f/up right knee. Aspiration at last visit with relief. Reports that she has been seeing a return of pain and swelling over the right knee  7/26/23: here to f/up right knee. Reports stiffness and swelling return 7/15 7/12/23: here to f/up right knee and euflexxa #3.  7/5/23: here to f/up right knee and euflexxa #2 6/28/23: here to f/up for right knee and euflexxa #1 6/7/23: here for f/up right knee. felt better after aspiration and csi (2/22/23). Pain returned 3/31/23. Has been doing hep. Pain aggravated after activity. Has experienced a sense of instabilty over the knee.  2/22/23: here for MRI results of right knee. Finished PT 2/13/23. No change in symptoms since last visit 1/18/23: here for pov s/p right knee pmm, Attending PT. c/o some persistent posterior stiffness and some continued difficulty with stairs.   12/7/22: Here for pov2 s/p right knee pmm. Doing well. Attending PT.  11/09/22 here for first post op visit  right knee arthroscopy, partial medial meniscectomy done on 10/31/22 doing well

## 2024-08-22 ENCOUNTER — APPOINTMENT (OUTPATIENT)
Dept: PAIN MANAGEMENT | Facility: CLINIC | Age: 59
End: 2024-08-22

## 2024-08-22 VITALS — BODY MASS INDEX: 37.22 KG/M2 | HEIGHT: 70 IN | WEIGHT: 260 LBS

## 2024-08-22 DIAGNOSIS — M54.2 CERVICALGIA: ICD-10-CM

## 2024-08-22 DIAGNOSIS — M50.10 CERVICAL DISC DISORDER WITH RADICULOPATHY, UNSPECIFIED CERVICAL REGION: ICD-10-CM

## 2024-08-22 DIAGNOSIS — M54.12 RADICULOPATHY, CERVICAL REGION: ICD-10-CM

## 2024-08-22 DIAGNOSIS — M50.30 OTHER CERVICAL DISC DEGENERATION, UNSPECIFIED CERVICAL REGION: ICD-10-CM

## 2024-08-22 PROCEDURE — 99204 OFFICE O/P NEW MOD 45 MIN: CPT

## 2024-08-22 NOTE — PHYSICAL EXAM
[de-identified] : Constitutional: - No acute distress - Well developed; well nourished  Neurological: - normal mood and affect - alert and oriented x 3  Cardiovascular: - grossly normal  Cervical Spine Exam:   Inspection: erythema (-) ecchymosis (-) rashes (-)   Palpation:                                               Cervical paraspinal tenderness:         R (-); L (-) Upper trapezius tenderness:              R (-); L (-) Rhomboids tenderness:                      R (-); L (-) Occipital Ridge:                                   R (-); L (-) Supraspinatus tenderness:                 R (-); L (-)   ROM: WNL; crepitus throughout ROM testing   Strength Testing:            Right    Left Deltoid                             (5/5)    (5/5) Biceps:                            (5/5)    (5/5) Triceps:                           (5/5)    (5/5) Finger Abductors:           (5/5)    (5/5) Grasp:                             (5/5)    (5/5)   Special Testing: Spurling Test:                  R (+); L (+) Facet load test:               R (-); L (-) Eng test:                  R (-); L (-)   Neuro: SILT throughout right upper extremity SI LT throughout left upper extremity   Reflexes: Biceps   -           R (2+); L (2+) Triceps  -           R (2+); L (2+) Brachioradialis- R (2+); L (2+)   No ankle clonus

## 2024-08-22 NOTE — HISTORY OF PRESENT ILLNESS
[Neck] : neck [0] : 0 [8] : 8 [Tingling] : tingling [Intermittent] : intermittent [Sleep] : sleep [Nothing helps with pain getting better] : Nothing helps with pain getting better [Lying in bed] : lying in bed [FreeTextEntry1] : The patient presents for initial evaluation regarding their neck pain.  She was previously seen approximately 4-5 years ago for similar complaint.  She complains of some neck pain with radiation to the bilateral upper extremities extending to the hands.  Described her symptoms as a burning in the bilateral hands.  Symptoms are worse in the evenings.  She denies any subjective weakness, difficulty fine motor movements of the hands, or bladder/bowel dysfunction.  She is experiencing exacerbation of symptoms following right total knee replacement which she had done several months ago.  Previously had cervical epidural start injections performed which completely resolved her symptoms.  This was approximately 4 years ago.   Subjective Weakness: No Numbness/Tingling: No Bladder/Bowel dysfunction: No Gait Abnormalities: No Fine motor coordination changes: No   Injections:  1) C7-T1 BLANKA (7/22/20, 12/16/20)   Pertinent Surgical History: N/A   Imaging: MRI Cervical Spine (5/12/2020) - O/C   Physician Disclaimer: I have personally reviewed and confirmed all HPI data with the patient. [] : no [FreeTextEntry6] : numbness  [FreeTextEntry7] : hand

## 2024-08-22 NOTE — REASON FOR VISIT
[Initial Consultation] : an initial pain management consultation [FreeTextEntry2] : Neck/bilateral upper extremity pain

## 2024-08-22 NOTE — ASSESSMENT
[FreeTextEntry1] : A discussion regarding available pain management treatment options occurred with the patient.  These included interventional, rehabilitative, pharmacological, and alternative modalities. We will proceed with the following:    Interventional treatment options: - Proceed with C7-T1 BLANKA with fluoroscopic guidance - see additional instructions below    Rehabilitative options: - Consider physical therapy trial once adequate relief - She is currently participating physical therapy for her right knee - participation in active HEP was discussed and encouraged as tolerated  Medication based treatment options: - Continue meloxicam 7.5-15 mg daily as needed - Patient prefers to avoid medication based therapies overall - see additional instructions below    Complementary treatment options: - Weight management and lifestyle modifications discussed  Additional treatment recommendations as follows: -Obtain MRI cervical spine - Follow up 1-2 weeks post injection for assessment of efficacy and further treatment recommendations  The risks, benefits and alternatives of the proposed procedure were explained in detail with the patient. The risks outlined include but are not limited to infection, bleeding, post- dural puncture headache, nerve injury, a temporary increase in pain, failure to resolve symptoms, need for future interventions, allergic reaction, and possible elevation of blood sugar in diabetics if using corticosteroid.  All questions were answered to patient's apparent satisfaction, and he/she verbalized an understanding.  We have discussed the risks, benefits, and alternatives for NSAID therapy including but not limited to the risk of bleeding, thrombosis, gastric mucosal irritation/ulceration, allergic reaction and kidney dysfunction.  The patient verbalizes an understanding.

## 2024-08-28 ENCOUNTER — APPOINTMENT (OUTPATIENT)
Dept: MRI IMAGING | Facility: CLINIC | Age: 59
End: 2024-08-28
Payer: COMMERCIAL

## 2024-08-28 PROCEDURE — 72141 MRI NECK SPINE W/O DYE: CPT

## 2024-09-06 ENCOUNTER — TRANSCRIPTION ENCOUNTER (OUTPATIENT)
Age: 59
End: 2024-09-06

## 2024-09-06 ENCOUNTER — APPOINTMENT (OUTPATIENT)
Dept: PAIN MANAGEMENT | Facility: CLINIC | Age: 59
End: 2024-09-06
Payer: COMMERCIAL

## 2024-09-06 DIAGNOSIS — M54.12 RADICULOPATHY, CERVICAL REGION: ICD-10-CM

## 2024-09-06 PROCEDURE — 62321 NJX INTERLAMINAR CRV/THRC: CPT

## 2024-09-06 NOTE — PROCEDURE
[FreeTextEntry3] : Date of Service: 09/06/2024   Account: [id number]  Patient: TAISHA MAYO   YOB: 1965  Age: 59 year  Surgeon:      Jonny Marroquin D.O.  Assistant:    None  Pre-Operative Diagnosis:         Cervical Radiculopathy (M54.12)  Post Operative Diagnosis:       Cervical Radiculopathy (M54.12)  Procedure:             Cervical (C7-T1) interlaminar epidural steroid injection under fluoroscopic guidance  Anesthesia:  MAC  This procedure was carried out using fluoroscopic guidance.  The risks and benefits of the procedure were discussed extensively with the patient.  The consent of the patient was obtained and the following procedure was performed.  A timeout was performed with all essential staff present and the site and side were verified.  The patient was placed in the prone position and optimized to patient comfort.  The cervical area was prepped and draped in a sterile fashion.  The fluoroscope visualized the C7-T1 interspace using slight cephalad-caudad angulation and this area was marked.  Using sterile technique the superficial skin was anesthetized with 1% Lidocaine.  A 20-gauge 3.5-inch Tuohy needle was advanced under fluoroscopy until ligament was engaged.  Using a contralateral oblique view, a "loss of resistance" to air technique was utilized in order to gain access to the epidural space.  After negative aspiration for heme and CSF, 1 cc of Omnipaque contrast was administered and the appropriate cervical epidurogram was obtained in the ERIN and A/P view as well as digital subtraction angiography.  A total injectate of 3 cc of preservative free normal saline and 80 mg of Kenalog was then injected into the epidural space while maintaining meaningful verbal contact with the patient.    Vital signs remained normal throughout the procedure.  The patient tolerated the procedure well.  There were no immediate complications from the performed procedure.  The patient was instructed to apply ice over the injection sites for twenty minutes every two hours for the next 24 hours.  Disposition:      1. The patient was advised to F/U in 1-2 weeks to assess the response to the injection.      2. The patient was also instructed to contact me immediately if there were any concerns related to the procedure performed.

## 2024-09-17 ENCOUNTER — APPOINTMENT (OUTPATIENT)
Dept: PAIN MANAGEMENT | Facility: CLINIC | Age: 59
End: 2024-09-17
Payer: COMMERCIAL

## 2024-09-17 VITALS — HEIGHT: 70 IN | WEIGHT: 262 LBS | BODY MASS INDEX: 37.51 KG/M2

## 2024-09-17 DIAGNOSIS — M50.30 OTHER CERVICAL DISC DEGENERATION, UNSPECIFIED CERVICAL REGION: ICD-10-CM

## 2024-09-17 DIAGNOSIS — M50.10 CERVICAL DISC DISORDER WITH RADICULOPATHY, UNSPECIFIED CERVICAL REGION: ICD-10-CM

## 2024-09-17 DIAGNOSIS — M54.12 RADICULOPATHY, CERVICAL REGION: ICD-10-CM

## 2024-09-17 PROCEDURE — 99214 OFFICE O/P EST MOD 30 MIN: CPT

## 2024-09-17 NOTE — HISTORY OF PRESENT ILLNESS
[Neck] : neck [Tingling] : tingling [Intermittent] : intermittent [2] : 2 [Injection therapy] : injection therapy [FreeTextEntry1] : 09/17/2024- Patient present for follow up visit after C7-T1 BLANKA on 9/6/24.   Patient also underwent a repeat cervical spine MRI.  patient reports greater than 85% reduction overall of her pain.  Reports a resolution of her hand paresthesias.  She is pleased with her response overall to the procedure.   8/22/2024 - the patient presents for initial evaluation regarding their neck pain.  She was previously seen approximately 4-5 years ago for similar complaint.  She complains of some neck pain with radiation to the bilateral upper extremities extending to the hands.  Described her symptoms as a burning in the bilateral hands.  Symptoms are worse in the evenings.  She denies any subjective weakness, difficulty fine motor movements of the hands, or bladder/bowel dysfunction.  She is experiencing exacerbation of symptoms following right total knee replacement which she had done several months ago.  Previously had cervical epidural start injections performed which completely resolved her symptoms.  This was approximately 4 years ago.   Injections:  1) C7-T1 BLANKA (7/22/20, 12/16/20, 9/6/2024)   Pertinent Surgical History: N/A   Imaging: MRI Cervical Spine (8/28/2024) - O/C   Physician Disclaimer: I have personally reviewed and confirmed all HPI data with the patient. [] : no [FreeTextEntry6] : numbness  [FreeTextEntry7] : hand

## 2024-09-17 NOTE — ASSESSMENT
[FreeTextEntry1] : A discussion regarding available pain management treatment options occurred with the patient.  These included interventional, rehabilitative, pharmacological, and alternative modalities. We will proceed with the following:    Interventional treatment options: - Would proceed with repeat C7-T1 BLANKA (80 mg Kenalog) with return of severe UE radicular pain-will call - see additional instructions below    Rehabilitative options: - She is currently participating physical therapy for her right knee - participation in active HEP was discussed and encouraged as tolerated  Medication based treatment options: - Continue meloxicam 7.5-15 mg daily as needed - Patient prefers to avoid medication based therapies overall - see additional instructions below    Complementary treatment options: - Weight management and lifestyle modifications discussed  Additional treatment recommendations as follows: - Follow-up for indicated procedure or as-needed basis   The risks, benefits and alternatives of the proposed procedure were explained in detail with the patient. The risks outlined include but are not limited to infection, bleeding, post- dural puncture headache, nerve injury, a temporary increase in pain, failure to resolve symptoms, need for future interventions, allergic reaction, and possible elevation of blood sugar in diabetics if using corticosteroid.  All questions were answered to patient's apparent satisfaction, and he/she verbalized an understanding.  We have discussed the risks, benefits, and alternatives for NSAID therapy including but not limited to the risk of bleeding, thrombosis, gastric mucosal irritation/ulceration, allergic reaction and kidney dysfunction.  The patient verbalizes an understanding.

## 2024-09-17 NOTE — PHYSICAL EXAM
[de-identified] : Constitutional: - No acute distress - Well developed; well nourished  Neurological: - normal mood and affect - alert and oriented x 3  Cardiovascular: - grossly normal  Cervical Spine Exam:   Inspection: erythema (-) ecchymosis (-) rashes (-)   Palpation:                                               Cervical paraspinal tenderness:         R (-); L (-) Upper trapezius tenderness:              R (-); L (-) Rhomboids tenderness:                      R (-); L (-) Occipital Ridge:                                   R (-); L (-) Supraspinatus tenderness:                 R (-); L (-)   ROM: WNL; crepitus throughout ROM testing   Strength Testing:            Right    Left Deltoid                             (5/5)    (5/5) Biceps:                            (5/5)    (5/5) Triceps:                           (5/5)    (5/5) Finger Abductors:           (5/5)    (5/5) Grasp:                             (5/5)    (5/5)   Special Testing: Spurling Test:                  R (=); L (=) Facet load test:               R (-); L (-) Eng test:                  R (-); L (-)   Neuro: SILT throughout right upper extremity SI LT throughout left upper extremity   Reflexes: Biceps   -           R (2+); L (2+) Triceps  -           R (2+); L (2+) Brachioradialis- R (2+); L (2+)   No ankle clonus

## 2024-09-23 ENCOUNTER — APPOINTMENT (OUTPATIENT)
Dept: PAIN MANAGEMENT | Facility: CLINIC | Age: 59
End: 2024-09-23

## 2025-01-27 ENCOUNTER — APPOINTMENT (OUTPATIENT)
Dept: PAIN MANAGEMENT | Facility: CLINIC | Age: 60
End: 2025-01-27
Payer: COMMERCIAL

## 2025-01-27 VITALS — BODY MASS INDEX: 37.94 KG/M2 | WEIGHT: 265 LBS | HEIGHT: 70 IN

## 2025-01-27 DIAGNOSIS — M50.10 CERVICAL DISC DISORDER WITH RADICULOPATHY, UNSPECIFIED CERVICAL REGION: ICD-10-CM

## 2025-01-27 DIAGNOSIS — M50.30 OTHER CERVICAL DISC DEGENERATION, UNSPECIFIED CERVICAL REGION: ICD-10-CM

## 2025-01-27 PROCEDURE — 99213 OFFICE O/P EST LOW 20 MIN: CPT

## 2025-02-07 ENCOUNTER — APPOINTMENT (OUTPATIENT)
Dept: PAIN MANAGEMENT | Facility: CLINIC | Age: 60
End: 2025-02-07
Payer: COMMERCIAL

## 2025-02-07 DIAGNOSIS — M54.12 RADICULOPATHY, CERVICAL REGION: ICD-10-CM

## 2025-02-07 PROCEDURE — 62321 NJX INTERLAMINAR CRV/THRC: CPT

## 2025-03-10 ENCOUNTER — APPOINTMENT (OUTPATIENT)
Dept: PAIN MANAGEMENT | Facility: CLINIC | Age: 60
End: 2025-03-10
Payer: COMMERCIAL

## 2025-03-10 VITALS — HEIGHT: 70 IN | WEIGHT: 269 LBS | BODY MASS INDEX: 38.51 KG/M2

## 2025-03-10 DIAGNOSIS — M50.10 CERVICAL DISC DISORDER WITH RADICULOPATHY, UNSPECIFIED CERVICAL REGION: ICD-10-CM

## 2025-03-10 DIAGNOSIS — M54.12 RADICULOPATHY, CERVICAL REGION: ICD-10-CM

## 2025-03-10 DIAGNOSIS — M54.50 LOW BACK PAIN, UNSPECIFIED: ICD-10-CM

## 2025-03-10 DIAGNOSIS — Z87.39 PERSONAL HISTORY OF OTHER DISEASES OF THE MUSCULOSKELETAL SYSTEM AND CONNECTIVE TISSUE: ICD-10-CM

## 2025-03-10 DIAGNOSIS — M79.605 LOW BACK PAIN, UNSPECIFIED: ICD-10-CM

## 2025-03-10 DIAGNOSIS — M54.16 RADICULOPATHY, LUMBAR REGION: ICD-10-CM

## 2025-03-10 DIAGNOSIS — M50.30 OTHER CERVICAL DISC DEGENERATION, UNSPECIFIED CERVICAL REGION: ICD-10-CM

## 2025-03-10 PROCEDURE — 99214 OFFICE O/P EST MOD 30 MIN: CPT

## 2025-07-07 ENCOUNTER — APPOINTMENT (OUTPATIENT)
Dept: PAIN MANAGEMENT | Facility: CLINIC | Age: 60
End: 2025-07-07

## 2025-07-07 VITALS — WEIGHT: 279 LBS | HEIGHT: 70 IN | BODY MASS INDEX: 39.94 KG/M2

## 2025-07-07 PROCEDURE — 99213 OFFICE O/P EST LOW 20 MIN: CPT

## 2025-08-01 ENCOUNTER — APPOINTMENT (OUTPATIENT)
Dept: PAIN MANAGEMENT | Facility: CLINIC | Age: 60
End: 2025-08-01
Payer: COMMERCIAL

## 2025-08-01 PROCEDURE — 62321 NJX INTERLAMINAR CRV/THRC: CPT

## 2025-08-15 ENCOUNTER — APPOINTMENT (OUTPATIENT)
Dept: PAIN MANAGEMENT | Facility: CLINIC | Age: 60
End: 2025-08-15

## 2025-08-18 ENCOUNTER — APPOINTMENT (OUTPATIENT)
Dept: PAIN MANAGEMENT | Facility: CLINIC | Age: 60
End: 2025-08-18

## 2025-08-18 VITALS — HEIGHT: 70 IN | WEIGHT: 275 LBS | BODY MASS INDEX: 39.37 KG/M2

## 2025-08-18 DIAGNOSIS — M50.30 OTHER CERVICAL DISC DEGENERATION, UNSPECIFIED CERVICAL REGION: ICD-10-CM

## 2025-08-18 DIAGNOSIS — R20.0 ANESTHESIA OF SKIN: ICD-10-CM

## 2025-08-18 DIAGNOSIS — M54.12 RADICULOPATHY, CERVICAL REGION: ICD-10-CM

## 2025-08-18 DIAGNOSIS — M50.10 CERVICAL DISC DISORDER WITH RADICULOPATHY, UNSPECIFIED CERVICAL REGION: ICD-10-CM

## 2025-08-18 PROCEDURE — 99214 OFFICE O/P EST MOD 30 MIN: CPT
